# Patient Record
Sex: FEMALE | Race: WHITE | NOT HISPANIC OR LATINO | URBAN - METROPOLITAN AREA
[De-identification: names, ages, dates, MRNs, and addresses within clinical notes are randomized per-mention and may not be internally consistent; named-entity substitution may affect disease eponyms.]

---

## 2022-08-01 ENCOUNTER — OUTPATIENT (OUTPATIENT)
Dept: OUTPATIENT SERVICES | Facility: HOSPITAL | Age: 42
LOS: 1 days | End: 2022-08-01
Payer: COMMERCIAL

## 2022-08-01 VITALS
OXYGEN SATURATION: 98 % | WEIGHT: 177.91 LBS | RESPIRATION RATE: 16 BRPM | HEART RATE: 66 BPM | SYSTOLIC BLOOD PRESSURE: 104 MMHG | TEMPERATURE: 99 F | HEIGHT: 69 IN | DIASTOLIC BLOOD PRESSURE: 71 MMHG

## 2022-08-01 DIAGNOSIS — Z98.890 OTHER SPECIFIED POSTPROCEDURAL STATES: Chronic | ICD-10-CM

## 2022-08-01 DIAGNOSIS — M51.26 OTHER INTERVERTEBRAL DISC DISPLACEMENT, LUMBAR REGION: ICD-10-CM

## 2022-08-01 DIAGNOSIS — Z01.818 ENCOUNTER FOR OTHER PREPROCEDURAL EXAMINATION: ICD-10-CM

## 2022-08-01 LAB
ANION GAP SERPL CALC-SCNC: 11 MMOL/L — SIGNIFICANT CHANGE UP (ref 5–17)
BUN SERPL-MCNC: 15 MG/DL — SIGNIFICANT CHANGE UP (ref 7–23)
CALCIUM SERPL-MCNC: 9.4 MG/DL — SIGNIFICANT CHANGE UP (ref 8.4–10.5)
CHLORIDE SERPL-SCNC: 104 MMOL/L — SIGNIFICANT CHANGE UP (ref 96–108)
CO2 SERPL-SCNC: 25 MMOL/L — SIGNIFICANT CHANGE UP (ref 22–31)
CREAT SERPL-MCNC: 0.88 MG/DL — SIGNIFICANT CHANGE UP (ref 0.5–1.3)
EGFR: 85 ML/MIN/1.73M2 — SIGNIFICANT CHANGE UP
GLUCOSE SERPL-MCNC: 98 MG/DL — SIGNIFICANT CHANGE UP (ref 70–99)
HCT VFR BLD CALC: 34.6 % — SIGNIFICANT CHANGE UP (ref 34.5–45)
HGB BLD-MCNC: 10.8 G/DL — LOW (ref 11.5–15.5)
MCHC RBC-ENTMCNC: 25.8 PG — LOW (ref 27–34)
MCHC RBC-ENTMCNC: 31.2 GM/DL — LOW (ref 32–36)
MCV RBC AUTO: 82.8 FL — SIGNIFICANT CHANGE UP (ref 80–100)
NRBC # BLD: 0 /100 WBCS — SIGNIFICANT CHANGE UP (ref 0–0)
PLATELET # BLD AUTO: 297 K/UL — SIGNIFICANT CHANGE UP (ref 150–400)
POTASSIUM SERPL-MCNC: 4.5 MMOL/L — SIGNIFICANT CHANGE UP (ref 3.5–5.3)
POTASSIUM SERPL-SCNC: 4.5 MMOL/L — SIGNIFICANT CHANGE UP (ref 3.5–5.3)
RBC # BLD: 4.18 M/UL — SIGNIFICANT CHANGE UP (ref 3.8–5.2)
RBC # FLD: 14.5 % — SIGNIFICANT CHANGE UP (ref 10.3–14.5)
SODIUM SERPL-SCNC: 140 MMOL/L — SIGNIFICANT CHANGE UP (ref 135–145)
WBC # BLD: 8.14 K/UL — SIGNIFICANT CHANGE UP (ref 3.8–10.5)
WBC # FLD AUTO: 8.14 K/UL — SIGNIFICANT CHANGE UP (ref 3.8–10.5)

## 2022-08-01 PROCEDURE — 87389 HIV-1 AG W/HIV-1&-2 AB AG IA: CPT

## 2022-08-01 PROCEDURE — G0463: CPT

## 2022-08-01 PROCEDURE — 80048 BASIC METABOLIC PNL TOTAL CA: CPT

## 2022-08-01 PROCEDURE — 85027 COMPLETE CBC AUTOMATED: CPT

## 2022-08-01 RX ORDER — CEFAZOLIN SODIUM 1 G
2000 VIAL (EA) INJECTION ONCE
Refills: 0 | Status: DISCONTINUED | OUTPATIENT
Start: 2022-08-08 | End: 2022-08-22

## 2022-08-01 NOTE — H&P PST ADULT - HISTORY OF PRESENT ILLNESS
43 y/o female c/o lower back pain x 8 months radiating to right buttocks (denies any numbness or tinging) s/p PT with minimal help, MRI done and found to have L4-5 herniated disc. Today she presents to PST for scheduled Right L4-5 Discectomy on 8/8/22. Denies any palpitations, SOB, N/V, fever or chills.   ***COVID swab scheduled for 8/5/22***

## 2022-08-01 NOTE — H&P PST ADULT - FALL HARM RISK - UNIVERSAL INTERVENTIONS
Bed in lowest position, wheels locked, appropriate side rails in place/Call bell, personal items and telephone in reach/Instruct patient to call for assistance before getting out of bed or chair/Non-slip footwear when patient is out of bed/[place] to call system/Physically safe environment - no spills, clutter or unnecessary equipment/Purposeful Proactive Rounding/Room/bathroom lighting operational, light cord in reach

## 2022-08-01 NOTE — H&P PST ADULT - PROBLEM SELECTOR PLAN 1
Right L4-5 Discectomy on 8/8/22  Pre-op education provided - all questions answered. Pt verbalized understanding

## 2022-08-01 NOTE — H&P PST ADULT - NSANTHOSAYNRD_GEN_A_CORE
No. ROSALIND screening performed.  STOP BANG Legend: 0-2 = LOW Risk; 3-4 = INTERMEDIATE Risk; 5-8 = HIGH Risk

## 2022-08-02 LAB — HIV 1+2 AB+HIV1 P24 AG SERPL QL IA: SIGNIFICANT CHANGE UP

## 2022-08-05 ENCOUNTER — OUTPATIENT (OUTPATIENT)
Dept: OUTPATIENT SERVICES | Facility: HOSPITAL | Age: 42
LOS: 1 days | End: 2022-08-05
Payer: COMMERCIAL

## 2022-08-05 DIAGNOSIS — Z98.890 OTHER SPECIFIED POSTPROCEDURAL STATES: Chronic | ICD-10-CM

## 2022-08-05 DIAGNOSIS — Z11.52 ENCOUNTER FOR SCREENING FOR COVID-19: ICD-10-CM

## 2022-08-05 LAB — SARS-COV-2 RNA SPEC QL NAA+PROBE: SIGNIFICANT CHANGE UP

## 2022-08-05 PROCEDURE — U0003: CPT

## 2022-08-05 PROCEDURE — C9803: CPT

## 2022-08-05 PROCEDURE — U0005: CPT

## 2022-08-08 ENCOUNTER — OUTPATIENT (OUTPATIENT)
Dept: INPATIENT UNIT | Facility: HOSPITAL | Age: 42
LOS: 1 days | End: 2022-08-08
Payer: COMMERCIAL

## 2022-08-08 VITALS
SYSTOLIC BLOOD PRESSURE: 112 MMHG | HEIGHT: 69 IN | TEMPERATURE: 98 F | WEIGHT: 177.91 LBS | RESPIRATION RATE: 17 BRPM | HEART RATE: 62 BPM | OXYGEN SATURATION: 100 % | DIASTOLIC BLOOD PRESSURE: 75 MMHG

## 2022-08-08 VITALS
RESPIRATION RATE: 16 BRPM | OXYGEN SATURATION: 100 % | SYSTOLIC BLOOD PRESSURE: 114 MMHG | HEART RATE: 61 BPM | DIASTOLIC BLOOD PRESSURE: 77 MMHG

## 2022-08-08 DIAGNOSIS — Z01.818 ENCOUNTER FOR OTHER PREPROCEDURAL EXAMINATION: ICD-10-CM

## 2022-08-08 DIAGNOSIS — Z98.890 OTHER SPECIFIED POSTPROCEDURAL STATES: Chronic | ICD-10-CM

## 2022-08-08 DIAGNOSIS — M51.26 OTHER INTERVERTEBRAL DISC DISPLACEMENT, LUMBAR REGION: ICD-10-CM

## 2022-08-08 LAB
BLD GP AB SCN SERPL QL: NEGATIVE — SIGNIFICANT CHANGE UP
HCG UR QL: NEGATIVE — SIGNIFICANT CHANGE UP
RH IG SCN BLD-IMP: NEGATIVE — SIGNIFICANT CHANGE UP
RH IG SCN BLD-IMP: NEGATIVE — SIGNIFICANT CHANGE UP

## 2022-08-08 PROCEDURE — 86850 RBC ANTIBODY SCREEN: CPT

## 2022-08-08 PROCEDURE — C1889: CPT

## 2022-08-08 PROCEDURE — 81025 URINE PREGNANCY TEST: CPT

## 2022-08-08 PROCEDURE — C1769: CPT

## 2022-08-08 PROCEDURE — 76000 FLUOROSCOPY <1 HR PHYS/QHP: CPT

## 2022-08-08 PROCEDURE — 86900 BLOOD TYPING SEROLOGIC ABO: CPT

## 2022-08-08 PROCEDURE — 86901 BLOOD TYPING SEROLOGIC RH(D): CPT

## 2022-08-08 PROCEDURE — 63030 LAMOT DCMPRN NRV RT 1 LMBR: CPT | Mod: RT

## 2022-08-08 DEVICE — GWIRE METREX PROC O.O62IN MED: Type: IMPLANTABLE DEVICE | Site: RIGHT | Status: FUNCTIONAL

## 2022-08-08 DEVICE — SURGIFLO MATRIX WITH THROMBIN KIT: Type: IMPLANTABLE DEVICE | Site: RIGHT | Status: FUNCTIONAL

## 2022-08-08 DEVICE — SURGIFOAM PAD 8CM X 12.5CM X 10MM (100): Type: IMPLANTABLE DEVICE | Site: RIGHT | Status: FUNCTIONAL

## 2022-08-08 RX ORDER — HYDROMORPHONE HYDROCHLORIDE 2 MG/ML
0.5 INJECTION INTRAMUSCULAR; INTRAVENOUS; SUBCUTANEOUS
Refills: 0 | Status: DISCONTINUED | OUTPATIENT
Start: 2022-08-08 | End: 2022-08-08

## 2022-08-08 RX ORDER — SODIUM CHLORIDE 9 MG/ML
1000 INJECTION, SOLUTION INTRAVENOUS
Refills: 0 | Status: DISCONTINUED | OUTPATIENT
Start: 2022-08-08 | End: 2022-08-22

## 2022-08-08 RX ORDER — BENZOYL PEROXIDE MICRONIZED 5.8 %
1 TOWELETTE (EA) TOPICAL
Qty: 0 | Refills: 0 | DISCHARGE

## 2022-08-08 RX ORDER — FENTANYL CITRATE 50 UG/ML
50 INJECTION INTRAVENOUS
Refills: 0 | Status: DISCONTINUED | OUTPATIENT
Start: 2022-08-08 | End: 2022-08-08

## 2022-08-08 RX ORDER — SODIUM CHLORIDE 9 MG/ML
3 INJECTION INTRAMUSCULAR; INTRAVENOUS; SUBCUTANEOUS EVERY 8 HOURS
Refills: 0 | Status: DISCONTINUED | OUTPATIENT
Start: 2022-08-08 | End: 2022-08-08

## 2022-08-08 RX ORDER — ONDANSETRON 8 MG/1
4 TABLET, FILM COATED ORAL ONCE
Refills: 0 | Status: DISCONTINUED | OUTPATIENT
Start: 2022-08-08 | End: 2022-08-08

## 2022-08-08 RX ORDER — IBUPROFEN 200 MG
1 TABLET ORAL
Qty: 0 | Refills: 0 | DISCHARGE

## 2022-08-08 RX ORDER — LIDOCAINE HCL 20 MG/ML
0.2 VIAL (ML) INJECTION ONCE
Refills: 0 | Status: DISCONTINUED | OUTPATIENT
Start: 2022-08-08 | End: 2022-08-08

## 2022-08-08 RX ORDER — CHLORHEXIDINE GLUCONATE 213 G/1000ML
1 SOLUTION TOPICAL ONCE
Refills: 0 | Status: DISCONTINUED | OUTPATIENT
Start: 2022-08-08 | End: 2022-08-08

## 2022-08-08 NOTE — PATIENT PROFILE ADULT - FALL HARM RISK - UNIVERSAL INTERVENTIONS
Bed in lowest position, wheels locked, appropriate side rails in place/Call bell, personal items and telephone in reach/Instruct patient to call for assistance before getting out of bed or chair/Non-slip footwear when patient is out of bed/[place] to call system/Physically safe environment - no spills, clutter or unnecessary equipment/Purposeful Proactive Rounding/Room/bathroom lighting operational, light cord in reach

## 2022-08-08 NOTE — DISCHARGE NOTE NURSING/CASE MANAGEMENT/SOCIAL WORK - NSDCPEFALRISK_GEN_ALL_CORE
For information on Fall & Injury Prevention, visit: [email or web address] OR  [email or web address] OR  https://www.cdc.gov/steadi/patient.html

## 2022-08-08 NOTE — ASU DISCHARGE PLAN (ADULT/PEDIATRIC) - ASU DC SPECIAL INSTRUCTIONSFT
No strenuous activity. No heavy lifting. Do not return to work until cleared by physician. No driving until cleared by physician.    You may remove any dressing and shower on the 1st day after you surgery.  No soaking in tub, do not remove steri strips. Do not apply any ointments to incision.    Call your Neurosurgeon for appointment in 1-2 weeks for wound check.    Return to Emergency Department or contact your Neurosurgeon if any changes in mental status, weakness, numbness or tingling of extremities; difficulty swallowing; drainage or redness of wound, fever; pain in legs; difficulty urinating or constipation.    Do NOT restart your Aspirin or take any Motrin/NSAIDS until checking with your Neurosurgeon and instructed to do so.

## 2022-08-08 NOTE — ASU DISCHARGE PLAN (ADULT/PEDIATRIC) - CARE PROVIDER_API CALL
Tariq Gomez)  Neurosurgery  [address] [place] [address], [address]  [place] [address]  Phone: [phone]  Fax: [phone]  Follow Up Time: 2 weeks

## 2022-08-08 NOTE — DISCHARGE NOTE NURSING/CASE MANAGEMENT/SOCIAL WORK - PATIENT PORTAL LINK FT
You can access the FollowMyHealth Patient Portal offered by [facility] by registering at the following website: [email or web address]. By joining [employer]’s FollowMyHealth portal, you will also be able to view your health information using other applications (apps) compatible with our system.

## 2022-08-08 NOTE — ASU DISCHARGE PLAN (ADULT/PEDIATRIC) - NS MD DC FALL RISK RISK
For information on Fall & Injury Prevention, visit: [email or web address] OR  [email or web address]-injury OR  https://www.cdc.gov/steadi/patient.html

## (undated) DEVICE — DRSG CURITY GAUZE SPONGE 4 X 4" 12-PLY

## (undated) DEVICE — Device

## (undated) DEVICE — GLV 6.5 PROTEXIS (WHITE)

## (undated) DEVICE — DRSG TELFA 3 X 8

## (undated) DEVICE — POSITIONER CUSHION INSERT PRONE VIEW LG

## (undated) DEVICE — DRAPE 3/4 SHEET W REINFORCEMENT 56X77"

## (undated) DEVICE — SYR LUER LOK 20CC

## (undated) DEVICE — DRAPE MAYO STAND 30"

## (undated) DEVICE — FOLEY TRAY 16FR 5CC LTX UMETER CLOSED

## (undated) DEVICE — MEDICATION LABELS W MARKER

## (undated) DEVICE — DRAPE TOWEL BLUE 17" X 24"

## (undated) DEVICE — SUT POLYSORB 2-0 30" GU-45

## (undated) DEVICE — SUT VICRYL 3-0 18" X-1 (POP-OFF)

## (undated) DEVICE — DRSG MASTISOL

## (undated) DEVICE — DRAPE EQUIPMENT COVER 27"

## (undated) DEVICE — DRSG XEROFORM 1 X 8"

## (undated) DEVICE — SOL IRR POUR H2O 250ML

## (undated) DEVICE — SOL IRR POUR NS 0.9% 500ML

## (undated) DEVICE — DRAPE 1/2 SHEET 40X57"

## (undated) DEVICE — MARKING PEN W RULER

## (undated) DEVICE — GLV 8.5 PROTEXIS (WHITE)

## (undated) DEVICE — PREP CHLORAPREP HI-LITE ORANGE 26ML

## (undated) DEVICE — DRSG TAPE HYPAFIX 4"

## (undated) DEVICE — MIDAS REX MR7 LUBRICANT DIFFUSER CARTRIDGE

## (undated) DEVICE — KNIFE MEDTRONIC ENT BAYONET 134MM

## (undated) DEVICE — SUT BIOSYN 4-0 18" P-12

## (undated) DEVICE — SPECIMEN CONTAINER 100ML

## (undated) DEVICE — GLV 7 PROTEXIS (WHITE)

## (undated) DEVICE — WARMING BLANKET LOWER ADULT

## (undated) DEVICE — GLV 7.5 PROTEXIS (WHITE)

## (undated) DEVICE — VENODYNE/SCD SLEEVE CALF LARGE

## (undated) DEVICE — POSITIONER FOAM EGG CRATE ULNAR 2PCS (PINK)

## (undated) DEVICE — MIDAS REX MR8 MATCH HEAD FLUTED LG BORE 3MM X 14CM

## (undated) DEVICE — GLV 8 PROTEXIS (WHITE)

## (undated) DEVICE — IRRIGATION TRAY W PISTON SYRINGE 60ML

## (undated) DEVICE — SUT VICRYL 2-0 18" CP-2 UNDYED (POP-OFF)

## (undated) DEVICE — NDL SPINAL 18G X 3.5" (PINK)

## (undated) DEVICE — PACK LUMBAR LAMI

## (undated) DEVICE — BLADE SCALPEL SAFETYLOCK #11

## (undated) DEVICE — BIPOLAR FORCEP SYMMETRY BAYONET 7" X 1.5MM SMOOTH (SILVER)

## (undated) DEVICE — DRSG STERISTRIPS 0.5 X 4"